# Patient Record
Sex: FEMALE | Race: WHITE | HISPANIC OR LATINO | URBAN - METROPOLITAN AREA
[De-identification: names, ages, dates, MRNs, and addresses within clinical notes are randomized per-mention and may not be internally consistent; named-entity substitution may affect disease eponyms.]

---

## 2018-05-24 ENCOUNTER — EMERGENCY (EMERGENCY)
Facility: HOSPITAL | Age: 38
LOS: 1 days | Discharge: HOME | End: 2018-05-24
Attending: EMERGENCY MEDICINE
Payer: SELF-PAY

## 2018-05-24 VITALS
OXYGEN SATURATION: 100 % | RESPIRATION RATE: 16 BRPM | DIASTOLIC BLOOD PRESSURE: 63 MMHG | SYSTOLIC BLOOD PRESSURE: 107 MMHG | TEMPERATURE: 98 F | HEART RATE: 68 BPM

## 2018-05-24 VITALS — WEIGHT: 130.07 LBS

## 2018-05-24 PROCEDURE — 99283 EMERGENCY DEPT VISIT LOW MDM: CPT

## 2018-05-24 RX ORDER — DEXAMETHASONE 0.5 MG/5ML
10 ELIXIR ORAL ONCE
Qty: 0 | Refills: 0 | Status: COMPLETED | OUTPATIENT
Start: 2018-05-24 | End: 2018-05-24

## 2018-05-24 RX ORDER — IBUPROFEN 200 MG
600 TABLET ORAL ONCE
Qty: 0 | Refills: 0 | Status: COMPLETED | OUTPATIENT
Start: 2018-05-24 | End: 2018-05-24

## 2018-05-24 RX ADMIN — Medication 10 MILLIGRAM(S): at 19:28

## 2018-05-24 RX ADMIN — Medication 30 MILLILITER(S): at 19:27

## 2018-05-24 RX ADMIN — Medication 600 MILLIGRAM(S): at 19:27

## 2018-05-24 NOTE — ED PROVIDER NOTE - OBJECTIVE STATEMENT
Dr. Madrid Note: 37F s/p mechanical fall 2 weeks ago with delayed onset of pain and swelling of L knee, L ankle, and L foot with surrounding paresthesia, worse with movement, better with time, no assoc fever, cp, sob.  Able to ambulate.

## 2018-05-24 NOTE — ED PROVIDER NOTE - CARE PLAN
Principal Discharge DX:	Contusion of leg, right, initial encounter  Secondary Diagnosis:	Hordeolum externum of right lower eyelid

## 2018-05-24 NOTE — ED PROVIDER NOTE - PHYSICAL EXAMINATION
contusion of L soft tissue medial ankle and dorsum of foot and inferior to L knee.  No bony td, FROM of joints without pain, sensation fully intact.    +stye R lower lid, small

## 2018-05-24 NOTE — ED ADULT NURSE NOTE - OBJECTIVE STATEMENT
38 y/o female seen in [facility]  s/p mechanical fall 2 weeks ago, c/o pain and swelling of L knee, L ankle, and L foot, pain worse with movement, better with time.  No fever,  no chest pain, no SOB, pt able to ambulate.  No acute distress noted.  Extremities mobile.

## 2021-03-30 NOTE — ED ADULT TRIAGE NOTE - AS TEMP SITE
POAG, OU:  INTRAOCULAR PRESSURE IS WITHIN ACCEPTABLE LIMITS. PATIENT INSTRUCTED WILL CONTINUE TO MONITOR IOP WITHOUT DROPS AT THIS TIME. RETURN FOR FOLLOW-UP AS SCHEDULED. oral